# Patient Record
Sex: MALE | URBAN - METROPOLITAN AREA
[De-identification: names, ages, dates, MRNs, and addresses within clinical notes are randomized per-mention and may not be internally consistent; named-entity substitution may affect disease eponyms.]

---

## 2021-03-23 ENCOUNTER — OUT OF OFFICE VISIT (OUTPATIENT)
Dept: URBAN - METROPOLITAN AREA MEDICAL CENTER 41 | Facility: MEDICAL CENTER | Age: 86
End: 2021-03-23
Payer: MEDICARE

## 2021-03-23 DIAGNOSIS — K59.89: ICD-10-CM

## 2021-03-23 PROCEDURE — 99222 1ST HOSP IP/OBS MODERATE 55: CPT | Performed by: INTERNAL MEDICINE

## 2021-03-23 PROCEDURE — 99232 SBSQ HOSP IP/OBS MODERATE 35: CPT | Performed by: INTERNAL MEDICINE

## 2021-03-23 PROCEDURE — G8427 DOCREV CUR MEDS BY ELIG CLIN: HCPCS | Performed by: INTERNAL MEDICINE

## 2021-04-20 ENCOUNTER — HOSPITAL ENCOUNTER (EMERGENCY)
Dept: HOSPITAL 5 - ED | Age: 86
End: 2021-04-20
Payer: MEDICARE

## 2021-04-20 DIAGNOSIS — Z87.891: ICD-10-CM

## 2021-04-20 DIAGNOSIS — Z79.899: ICD-10-CM

## 2021-04-20 DIAGNOSIS — I46.9: Primary | ICD-10-CM

## 2021-04-20 DIAGNOSIS — I48.91: ICD-10-CM

## 2021-04-20 DIAGNOSIS — F03.90: ICD-10-CM

## 2021-04-20 DIAGNOSIS — I10: ICD-10-CM

## 2021-04-20 DIAGNOSIS — Z86.73: ICD-10-CM

## 2021-04-20 PROCEDURE — 99285 EMERGENCY DEPT VISIT HI MDM: CPT

## 2021-04-20 NOTE — EMERGENCY DEPARTMENT REPORT
ED CPR HPI





- General


Chief Complaint: Cardiac Arrest/CPR


Stated Complaint: CARDIAC ARREST


Time Seen by Provider: 21 13:33


Source: EMS


Mode of arrival: Stretcher


Limitations: Other





- History of Present Illness


Initial Comments: 





Chief complaint: Respiratory cardiac arrest





HPI: This is an 87-year-old male with history of sepsis, chronic kidney disease,

atrial fibrillation who presents from [facility] nursing facility and 

respiratory cardiac arrest.  EMS was called for respiratory failure.  Nursing 

staff members were performing chest compressions.  According to EMS, patient had

weak radial pulse.  PEA then ensued.  EMS treated patient with airway control 

with intubation.  Also received epinephrine.  Total time of resuscitation 15 

minutes prior to arrival per EMS.


MD Complaint: stopped breathing


Place: NH/SNF


Bystander CPR Performed: Yes


Shock Advised: No


Initial Findings in the Field: unresponsive, PEA


ROSC in the Field: No


Associated Injuries: No





- Related Data


                                  Previous Rx's











 Medication  Instructions  Recorded  Last Taken  Type


 


Ascorbic Acid [Vitamin C] 500 mg PO BID #60 tablet 21 Unknown Rx


 


Cholecalciferol Vit D3 [Vitamin D3 1,000 unit PO QDAY #30 tablet 21 

Unknown Rx





1,000 UNIT TAB]    


 


Famotidine [Pepcid] 20 mg PO QDAY #30 tablet 21 Unknown Rx


 


Zinc Sulfate 220 mg PO BID #60 capsule 21 Unknown Rx


 


cephALEXin [Keflex] 500 mg PO Q12HR #10 cap 21 Unknown Rx


 


Potassium Chloride 10 meq PO DAILY #10 tab.er.prt 21 Unknown Rx


 


Sennosides/Docusate [Senokot S] 1 tab PO QHS #30 tablet 21 Unknown Rx


 


bisacodyL [Dulcolax suppos] 10 mg NM BID #20 supp.rect 21 Unknown Rx


 


polyethylene glycoL 3350 [Miralax 17 gm PO DAILY PRN #30 powd.pack 21 Un

known Rx





3350]    











                                    Allergies











Allergy/AdvReac Type Severity Reaction Status Date / Time


 


No Known Allergies Allergy   Unverified 21 10:53














ED Review of Systems


ROS: 


Stated complaint: CARDIAC ARREST


Other details as noted in HPI





Comment: Unobtainable due to pts medical conditions





ED Past Medical Hx





- Past Medical History


Previous Medical History?: Yes


Hx Hypertension: Yes


Hx CVA: Yes


Hx Dementia: Yes


Additional medical history: afib





- Surgical History


Additional Surgical History: unknown





- Social History


Smoking Status: Former Smoker


Substance Use Type: None





- Medications


Home Medications: 


                                Home Medications











 Medication  Instructions  Recorded  Confirmed  Last Taken  Type


 


Ascorbic Acid [Vitamin C] 500 mg PO BID #60 tablet 21  Unknown Rx


 


Cholecalciferol Vit D3 [Vitamin D3 1,000 unit PO QDAY #30 tablet 21  

Unknown Rx





1,000 UNIT TAB]     


 


Famotidine [Pepcid] 20 mg PO QDAY #30 tablet 21  Unknown Rx


 


Zinc Sulfate 220 mg PO BID #60 capsule 21  Unknown Rx


 


cephALEXin [Keflex] 500 mg PO Q12HR #10 cap 21  Unknown Rx


 


Potassium Chloride 10 meq PO DAILY #10 tab.er.prt 21  Unknown Rx


 


Sennosides/Docusate [Senokot S] 1 tab PO QHS #30 tablet 21  Unknown Rx


 


bisacodyL [Dulcolax suppos] 10 mg NM BID #20 supp.rect 21  Unknown Rx


 


polyethylene glycoL 3350 [Miralax 17 gm PO DAILY PRN #30 powd.pack 21  

Unknown Rx





3350]     














ED Physical Exam





- General


Limitations: Other


General appearance: other (lifeless, pale, )





- Head


Head exam: Present: atraumatic, normocephalic





- Eye


Pupils: Present: other (Pupils fixed dilated, dried corneas)





- ENT


ENT exam: Present: mucous membranes dry





- Respiratory


Respiratory exam: Present: other (No respiratory effort no spontaneous 

breathing)





- Cardiovascular


Cardiovascular Exam: Present: gallop, other (No palpable pulses no auscultated 

heart sounds)





- GI/Abdominal


GI/Abdominal exam: Present: soft.  Absent: distended





- Extremities Exam


Extremities exam: Present: other (No deformities in the extremities)





- Neurological Exam


Neurological exam: Present: other (Lifeless no spontaneous movement)





- Skin


Skin exam: Present: rash, pallor





ED Medical Decision Making





- Medical Decision Making





Respiratory cardiac arrest: Upon arrival patient rhythm PEA, patient was 

intubated by EMS with ETT.  We continued ACLS for subsequent 9 minutes including

chest compressions, epinephrine, calcium, sodium bicarbonate.  Asystole was the 

terminal rhythm.  Time of death 1325.


Critical care attestation.: 


If time is entered above; I have spent that time in minutes in the direct care 

of this critically ill patient, excluding procedure time.








ED Disposition


Clinical Impression: 


 Cardiac arrest





Disposition: DC-20 


Is pt being admited?: No


Does the pt Need Aspirin: No


Condition: Stable


Time of Disposition: 13:25

## 2021-04-29 ENCOUNTER — OFFICE VISIT (OUTPATIENT)
Dept: URBAN - METROPOLITAN AREA CLINIC 118 | Facility: CLINIC | Age: 86
End: 2021-04-29